# Patient Record
Sex: MALE | Race: WHITE | Employment: FULL TIME | URBAN - METROPOLITAN AREA
[De-identification: names, ages, dates, MRNs, and addresses within clinical notes are randomized per-mention and may not be internally consistent; named-entity substitution may affect disease eponyms.]

---

## 2020-09-11 ENCOUNTER — HOSPITAL ENCOUNTER (EMERGENCY)
Facility: CLINIC | Age: 50
Discharge: HOME | End: 2020-09-11
Attending: FAMILY MEDICINE | Admitting: FAMILY MEDICINE
Payer: COMMERCIAL

## 2020-09-11 VITALS
HEART RATE: 60 BPM | BODY MASS INDEX: 27.47 KG/M2 | TEMPERATURE: 98.2 F | WEIGHT: 175 LBS | DIASTOLIC BLOOD PRESSURE: 92 MMHG | OXYGEN SATURATION: 99 % | RESPIRATION RATE: 18 BRPM | SYSTOLIC BLOOD PRESSURE: 138 MMHG | HEIGHT: 67 IN

## 2020-09-11 DIAGNOSIS — M54.16 LUMBAR RADICULOPATHY: ICD-10-CM

## 2020-09-11 PROCEDURE — 99283 EMERGENCY DEPT VISIT LOW MDM: CPT | Performed by: FAMILY MEDICINE

## 2020-09-11 PROCEDURE — 99284 EMERGENCY DEPT VISIT MOD MDM: CPT | Mod: Z6 | Performed by: FAMILY MEDICINE

## 2020-09-11 PROCEDURE — 25000131 ZZH RX MED GY IP 250 OP 636 PS 637: Performed by: FAMILY MEDICINE

## 2020-09-11 RX ORDER — GABAPENTIN 300 MG/1
300 CAPSULE ORAL AT BEDTIME
Qty: 30 CAPSULE | Refills: 0 | Status: SHIPPED | OUTPATIENT
Start: 2020-09-11

## 2020-09-11 RX ADMIN — DEXAMETHASONE 10 MG: 2 TABLET ORAL at 10:38

## 2020-09-11 ASSESSMENT — ENCOUNTER SYMPTOMS
NAUSEA: 0
WEAKNESS: 0
ABDOMINAL PAIN: 0
VOMITING: 0
BACK PAIN: 1
NUMBNESS: 1
FLANK PAIN: 0
FEVER: 0
COUGH: 0

## 2020-09-11 ASSESSMENT — MIFFLIN-ST. JEOR: SCORE: 1617.42

## 2020-09-11 NOTE — ED AVS SNAPSHOT
[facility] Emergency Department  [address] [address]  Phone:  [phone]  Fax:  [phone]                                    Gee Kumar   MRN: [id number]    Department:  [facility] Emergency Department   Date of Visit:  9/11/2020           After Visit Summary Signature Page    I have received my discharge instructions, and my questions have been answered. I have discussed any challenges I see with this plan with the nurse or doctor.    ..........................................................................................................................................  Patient/Patient Representative Signature      ..........................................................................................................................................  Patient Representative Print Name and Relationship to Patient    ..................................................               ................................................  Date                                   Time    ..........................................................................................................................................  Reviewed by Signature/Title    ...................................................              ..............................................  Date                                               Time          22EPIC Rev 08/18

## 2020-09-11 NOTE — DISCHARGE INSTRUCTIONS
ICD-10-CM    1. Lumbar radiculopathy  M54.16     You were given decardon 10 mg here.  take gabapentin 300 mg at bedtime as trial for the next 30 days.  take ibyuprofen 600 mg every 6 hours, tylenol 1000 mg every 6 hours.   see Macenize  Heal your own back. follow-up primaryu provider next week.  return immed for numbness nner  thighs, foot drop, incontinence/retetntion urine/stool

## 2020-09-11 NOTE — ED PROVIDER NOTES
History     Chief Complaint   Patient presents with     Back Pain     Right low backr adiates down buttock and wraps around to front of groin. Pt reports he has been seeing the chiroprator withour much relief      HPI  Gee Kumar is a 50 year old male who presents with a history of recent right low back pain radiating into the buttock and into the inguinal region.  Not relieved with recent chiropractic care.  History of 2016 thoracic back pain not relieved with physical therapy.  He was prescribed Tylenol 3 in mid August on PDMP.    He tells me that for the last week he has been experiencing initial low back pain in the lower lumbar region along with pain at the lateral thigh and in the lateral aspect of the inguinal region but not into the testicular/scrotal region and then periodic episodes of hyperesthesia below the level of the knee in the dorsal foot and anterior lower leg.  This seems to follow an L4 distribution.  His back pain is improved and he seen physical therapy 3 times for this so far.  He denies cauda equina symptoms as listed below.  No associated fever.  No spine surgery.  He has no abdominal pain nausea vomiting or systemic symptoms.  There is no lower extremity weakness.  He was hoping to get some relief to allow him to fish tomorrow.  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    No symptoms suggestive of  cauda equina syndrome (central spinal stenosis) such as incontinence or retention of urine or stool, inner thigh numbness or foot drop.        Allergies:  Allergies   Allergen Reactions     Penicillins      Unknown  reaction       Problem List:    There are no active problems to display for this patient.       Past Medical History:    No past medical history on file.    Past Surgical History:    No past surgical history on file.    Family History:    Family History   Problem Relation Age of Onset      "Rheumatoid Arthritis Sister        Social History:  Marital Status:   [2]  Social History     Tobacco Use     Smoking status: Never Smoker   Substance Use Topics     Alcohol use: Yes     Comment: occas     Drug use: No        Medications:    No current outpatient medications on file.        Review of Systems   Constitutional: Negative for fever.   Respiratory: Negative for cough.    Cardiovascular: Negative for chest pain.   Gastrointestinal: Negative for abdominal pain, nausea and vomiting.   Genitourinary: Negative for flank pain.   Musculoskeletal: Positive for back pain.   Skin: Negative for pallor and rash.   Neurological: Positive for numbness. Negative for weakness.          Physical Exam   BP: (!) 138/92  Pulse: 60  Temp: 98.2  F (36.8  C)  Resp: 18  Height: 170.2 cm (5' 7\")  Weight: 79.4 kg (175 lb)  SpO2: 99 %         Physical Exam     Lumbar back is without obvious tenderness to palpation and there is no reduced range of motion of the low back and forward flexion extension lateral bending and rotation.  No rash present.  He has no significant tenderness to palpation over the course of the lateral right thigh or right leg.  He has normal motor function 5 out of 5 strength lower extremity including dorsiflexion against resistance.  Negative straight leg raise.  DTRs are 2+ at the patella and 1+ at Achilles.  Figure-of-four testing negative with negative.  1 legged extension.  Hip and knee range of motion is painful and normal.    No significant paraspinous muscle tenderness to palpation.  No obvious deformity.    ED Course          Procedures               Critical Care time:  none               No results found for this or any previous visit (from the past 24 hour(s)).    Medications - No data to display    Assessments & Plan (with Medical Decision Making)     MDM: Gee Kumar is a 50 year old male who presents with 1 week of low back pain without cauda equina symptoms but with radiation of pain " along the L4 distribution.  No signs of SI joint dysfunction, the sciatic notch is nontender.    We discussed options for management.  I recommended that although evidences week for corticosteroid for this that we attempt Decadron orally now 10 mg and trial on gabapentin for what I suspect is a lumbar radiculopathy.  Discussed the book Hilda heal your own back and follow-up with primary care this week.  Discussed that MRI would not be indicated unless cauda equina symptoms were to develop and he is told about those for emergent return.  We also discussed following up with chiropractic care and could consider empiric traction.  Additional management as below.      I have reviewed the nursing notes.    I have reviewed the findings, diagnosis, plan and need for follow up with the patient.       Discharge Medication List as of 9/11/2020 10:35 AM      START taking these medications    Details   gabapentin (NEURONTIN) 300 MG capsule Take 1 capsule (300 mg) by mouth At Bedtime, Disp-30 capsule,R-0, E-Prescribe             Final diagnoses:   Lumbar radiculopathy - You were given decardon 10 mg here.  take gabapentin 300 mg at bedtime as trial for the next 30 days.  take ibyuprofen 600 mg every 6 hours, tylenol 1000 mg every 6 hours.   see Lennynipankaj  Heal your own back. follow-up primaryu provider next week.  return immed for numbness nner  thighs, foot drop, incontinence/retetntion urine/stool       9/11/2020   [facility] EMERGENCY DEPARTMENT     Ja Paredes MD  09/11/20 [id number]

## 2020-09-14 ENCOUNTER — HOSPITAL ENCOUNTER (EMERGENCY)
Facility: CLINIC | Age: 50
Discharge: HOME | End: 2020-09-14
Attending: PHYSICIAN ASSISTANT | Admitting: PHYSICIAN ASSISTANT
Payer: COMMERCIAL

## 2020-09-14 VITALS
TEMPERATURE: 98 F | HEART RATE: 63 BPM | OXYGEN SATURATION: 99 % | SYSTOLIC BLOOD PRESSURE: 169 MMHG | RESPIRATION RATE: 18 BRPM | DIASTOLIC BLOOD PRESSURE: 94 MMHG

## 2020-09-14 DIAGNOSIS — B02.9 SHINGLES: ICD-10-CM

## 2020-09-14 PROCEDURE — G0463 HOSPITAL OUTPT CLINIC VISIT: HCPCS | Performed by: PHYSICIAN ASSISTANT

## 2020-09-14 PROCEDURE — 99213 OFFICE O/P EST LOW 20 MIN: CPT | Mod: Z6 | Performed by: PHYSICIAN ASSISTANT

## 2020-09-14 RX ORDER — VALACYCLOVIR HYDROCHLORIDE 1 G/1
1000 TABLET, FILM COATED ORAL 3 TIMES DAILY
Qty: 21 TABLET | Refills: 0 | Status: SHIPPED | OUTPATIENT
Start: 2020-09-14 | End: 2020-09-21

## 2020-09-14 RX ORDER — METHYLPREDNISOLONE 4 MG
TABLET, DOSE PACK ORAL
Qty: 21 TABLET | Refills: 0 | Status: SHIPPED | OUTPATIENT
Start: 2020-09-14

## 2020-09-14 ASSESSMENT — ENCOUNTER SYMPTOMS
FEVER: 0
NECK STIFFNESS: 0
NUMBNESS: 0
WEAKNESS: 0
COLOR CHANGE: 0
CONFUSION: 0
EYE REDNESS: 0
CONSTITUTIONAL NEGATIVE: 1
ARTHRALGIAS: 0
SHORTNESS OF BREATH: 0
DIFFICULTY URINATING: 0
ABDOMINAL PAIN: 0
HEADACHES: 0

## 2020-09-14 NOTE — ED PROVIDER NOTES
History     Chief Complaint   Patient presents with     Rash     rash to back of right knee.  Thinks might be shingles     HPI  Gee Kumar is a 50 year old male who presents to the Emergency Room today with rash to the back of the right knee that he noticed about 1 week ago, but it seems to be getting worse. Patient states he has been dealing with lower back pain and right radicular pain for the past few weeks. Patient states he has seen a chiropractor and was seen her in the Emergency Room 4 days ago and given decadron single dose and gabapentin. Patient state the lower back pain has improved and the nerve pain in the leg is not as severe, but the rash on the back of the knee is still present and he is concerned it may be shingles. Patient denies fevers, fatigue, Covid 19 exposure, itching, new exposures, or contact dermatitis exposure. Patient state the rash is now crusting and some are scabbed and it is more irritating and slightly painful.     Allergies:  Allergies   Allergen Reactions     Penicillins      Unknown  reaction       Problem List:    There are no active problems to display for this patient.       Past Medical History:    History reviewed. No pertinent past medical history.    Past Surgical History:    History reviewed. No pertinent surgical history.    Family History:    Family History   Problem Relation Age of Onset     Rheumatoid Arthritis Sister        Social History:  Marital Status:   [2]  Social History     Tobacco Use     Smoking status: Never Smoker   Substance Use Topics     Alcohol use: Yes     Comment: occas     Drug use: No        Medications:    methylPREDNISolone (MEDROL DOSEPAK) 4 MG tablet therapy pack  valACYclovir (VALTREX) 1000 mg tablet  gabapentin (NEURONTIN) 300 MG capsule          Review of Systems   Constitutional: Negative.  Negative for fever.   HENT: Negative for congestion.    Eyes: Negative for redness.   Respiratory: Negative for shortness of breath.     Cardiovascular: Negative for chest pain.   Gastrointestinal: Negative for abdominal pain.   Genitourinary: Negative for difficulty urinating.   Musculoskeletal: Negative for arthralgias and neck stiffness.   Skin: Positive for rash (to the righ posterior knee ). Negative for color change.   Neurological: Negative for weakness, numbness and headaches.   Psychiatric/Behavioral: Negative for confusion.   All other systems reviewed and are negative.      Physical Exam   BP: (!) 169/94  Pulse: 63  Temp: 98  F (36.7  C)  Resp: 18  SpO2: 99 %      Physical Exam  Vitals signs and nursing note reviewed.   Constitutional:       General: He is not in acute distress.     Appearance: Normal appearance. He is normal weight. He is not diaphoretic.   HENT:      Head: Atraumatic.   Eyes:      General: No scleral icterus.     Pupils: Pupils are equal, round, and reactive to light.   Cardiovascular:      Rate and Rhythm: Normal rate and regular rhythm.      Pulses: Normal pulses.      Heart sounds: Normal heart sounds.   Pulmonary:      Effort: Pulmonary effort is normal. No respiratory distress.      Breath sounds: Normal breath sounds.   Musculoskeletal: Normal range of motion.         General: No swelling or tenderness.      Right hip: Normal.      Right knee: Normal.      Right ankle: Normal. Achilles tendon normal.      Lumbar back: Normal.      Right lower leg: No edema.      Left lower leg: No edema.      Comments: Negative straight leg raise bilaterally.  Strength 5 out of 5 to bilateral lower extremities.  Bilateral neuro lower extremities neurovascularly intact.   Skin:     General: Skin is warm.      Capillary Refill: Capillary refill takes less than 2 seconds.      Findings: Rash (positive erythematous rash with vesicles, areas or crusting and a few with scabes to the posterior right knee.  Consistent with herpes zoster rash. ) present. No bruising or erythema.   Neurological:      General: No focal deficit present.       Mental Status: He is alert and oriented to person, place, and time.      Sensory: No sensory deficit.      Motor: No weakness.      Gait: Gait normal.   Psychiatric:         Mood and Affect: Mood normal.         Behavior: Behavior normal.         Thought Content: Thought content normal.         Judgment: Judgment normal.         ED Course        Procedures              Critical Care time:  none               No results found for this or any previous visit (from the past 24 hour(s)).    Medications - No data to display    Assessments & Plan (with Medical Decision Making)     I have reviewed the nursing notes.    I have reviewed the findings, diagnosis, plan and need for follow up with the patient.   50-year-old male presents emergency department with rash to the posterior aspect of the right knee that occurred about a week ago.  Prior to this he was having some nerve pain in the lower back and radiating down the right leg not sure if this is related to this rash or not but he is concerned that he may have shingles.  See exam findings above.  On exam findings consistent with herpes zoster.  Patient given prescription Valtrex for this.  Patient also stated the steroids did help with the nerve pain that he was given a few days ago and was wondering if he can get a another prescription just in case the pain worsens again.  I discussed that steroids may not help much with the herpes zoster rash and if your pain worsens or changes he is to follow-up with orthopedic specialist or return for evaluation.  Patient is aware but would like a wait-and-see prescription.  Patient was given Medrol Dosepak wait-and-see prescription to fill if symptoms worsen or change.  Patient discharged in stable condition.  No concerns at this time for cellulitis.    Discharge Medication List as of 9/14/2020  1:32 PM      START taking these medications    Details   methylPREDNISolone (MEDROL DOSEPAK) 4 MG tablet therapy pack Follow Package  Directions, Disp-21 tablet,R-0, Local Print      valACYclovir (VALTREX) 1000 mg tablet Take 1 tablet (1,000 mg) by mouth 3 times daily for 7 days, Disp-21 tablet,R-0, E-Prescribe             Final diagnoses:   Shingles       9/14/2020   [facility] EMERGENCY DEPARTMENT     Lisa Hudson PA-C  09/14/20 [id number]

## 2020-09-14 NOTE — ED AVS SNAPSHOT
[facility] Emergency Department  [address] [address]  Phone:  [phone]  Fax:  [phone]                                    Gee Kumar   MRN: [id number]    Department:  [facility] Emergency Department   Date of Visit:  9/14/2020           After Visit Summary Signature Page    I have received my discharge instructions, and my questions have been answered. I have discussed any challenges I see with this plan with the nurse or doctor.    ..........................................................................................................................................  Patient/Patient Representative Signature      ..........................................................................................................................................  Patient Representative Print Name and Relationship to Patient    ..................................................               ................................................  Date                                   Time    ..........................................................................................................................................  Reviewed by Signature/Title    ...................................................              ..............................................  Date                                               Time          22EPIC Rev 08/18

## 2020-09-14 NOTE — DISCHARGE INSTRUCTIONS
Patient informed of viral etiology and treatment discussed in office.   Patient contagious until all vesicles scabbed over. Patient to avoid pregnant women, the immunocompromised, the very young and elderly.     Use medication as directed; start valtrex today. Fill steroid pack if radicular leg pain worsens again.     Follow up with primary care provider as needed.   Go to Emergency Room if sx worsen or change or signs/symptoms of infection occur.   May use acetaminophen, ibuprofen as needed.     Patient voiced understanding of instructions given.

## 2023-05-05 ENCOUNTER — HOSPITAL ENCOUNTER (EMERGENCY)
Facility: CLINIC | Age: 53
End: 2023-05-05
Payer: COMMERCIAL